# Patient Record
Sex: FEMALE | Race: WHITE | NOT HISPANIC OR LATINO
[De-identification: names, ages, dates, MRNs, and addresses within clinical notes are randomized per-mention and may not be internally consistent; named-entity substitution may affect disease eponyms.]

---

## 2019-10-07 PROBLEM — Z00.00 ENCOUNTER FOR PREVENTIVE HEALTH EXAMINATION: Status: ACTIVE | Noted: 2019-10-07

## 2019-10-18 ENCOUNTER — APPOINTMENT (OUTPATIENT)
Dept: INTERNAL MEDICINE | Facility: CLINIC | Age: 69
End: 2019-10-18
Payer: MEDICARE

## 2019-10-18 VITALS
HEART RATE: 88 BPM | HEIGHT: 65 IN | DIASTOLIC BLOOD PRESSURE: 70 MMHG | TEMPERATURE: 98.9 F | SYSTOLIC BLOOD PRESSURE: 114 MMHG | WEIGHT: 152 LBS | BODY MASS INDEX: 25.33 KG/M2

## 2019-10-18 DIAGNOSIS — G89.29 OTHER CHRONIC PAIN: ICD-10-CM

## 2019-10-18 DIAGNOSIS — M16.51 UNILATERAL POST-TRAUMATIC OSTEOARTHRITIS, RIGHT HIP: ICD-10-CM

## 2019-10-18 DIAGNOSIS — Z87.39 PERSONAL HISTORY OF OTHER DISEASES OF THE MUSCULOSKELETAL SYSTEM AND CONNECTIVE TISSUE: ICD-10-CM

## 2019-10-18 DIAGNOSIS — E78.2 MIXED HYPERLIPIDEMIA: ICD-10-CM

## 2019-10-18 DIAGNOSIS — Z78.9 OTHER SPECIFIED HEALTH STATUS: ICD-10-CM

## 2019-10-18 DIAGNOSIS — G43.009 MIGRAINE W/OUT AURA, NOT INTRACTABLE, W/OUT STATUS MIGRAINOSUS: ICD-10-CM

## 2019-10-18 DIAGNOSIS — Z01.818 ENCOUNTER FOR OTHER PREPROCEDURAL EXAMINATION: ICD-10-CM

## 2019-10-18 PROCEDURE — 99204 OFFICE O/P NEW MOD 45 MIN: CPT

## 2019-10-18 RX ORDER — RIZATRIPTAN BENZOATE 10 MG/1
10 TABLET ORAL
Refills: 0 | Status: ACTIVE | COMMUNITY
Start: 2019-10-18

## 2019-10-18 RX ORDER — PITAVASTATIN CALCIUM 4.18 MG/1
4 TABLET, FILM COATED ORAL
Qty: 90 | Refills: 0 | Status: ACTIVE | COMMUNITY
Start: 2019-10-18

## 2019-10-18 RX ORDER — DULOXETINE HYDROCHLORIDE 20 MG/1
20 CAPSULE, DELAYED RELEASE ORAL DAILY
Refills: 0 | Status: ACTIVE | COMMUNITY
Start: 2019-10-18

## 2019-10-18 NOTE — ASSESSMENT
[Patient Optimized for Surgery] : Patient optimized for surgery [No Further Testing Recommended] : no further testing recommended [FreeTextEntry4] : Ms. DAWSON is a 69 year yo female with no h/o CAD and good exercise tolerance. She denies CP, palpitation or SOB and her EKG today is normal. She does not take blood thinners and denies sleep apnea or urinary obstruction symptoms. She does not have a h/o DVT. She will continue the prescription medications perioperatively as instructed. The morning of the procedure she will only take the Cymbalta.\par \par Ms. DAWSON has a moderate risk for perioperative cardiovascular complications and will undergo the procedure as planned. I will follow her postop.\par \par  ***More than 50% of the face to face time was devoted to counseling and/or coordination of care. The discussion and/or coordination of care included: perioperative medication management.\par \par  [As per surgery] : as per surgery

## 2019-10-18 NOTE — HISTORY OF PRESENT ILLNESS
[No Pertinent Cardiac History] : no history of aortic stenosis, atrial fibrillation, coronary artery disease, recent myocardial infarction, or implantable device/pacemaker [No Pertinent Pulmonary History] : no history of asthma, COPD, sleep apnea, or smoking [No Adverse Anesthesia Reaction] : no adverse anesthesia reaction in self or family member [Chronic Anticoagulation] : no chronic anticoagulation [Chronic Kidney Disease] : no chronic kidney disease [Diabetes] : no diabetes [(Patient denies any chest pain, claudication, dyspnea on exertion, orthopnea, palpitations or syncope)] : Patient denies any chest pain, claudication, dyspnea on exertion, orthopnea, palpitations or syncope [FreeTextEntry1] : R THR , conversion for partial hip [FreeTextEntry3] : DR Chan [FreeTextEntry2] : 10/30 [FreeTextEntry4] : Dear Dr. Chan : Thank you for referring Ms. DAWSON for preoperative evaluation prior to R THR on 10/30.  She  is complaining of R hip pain since earlier this year after a fall. pain is getting worse in the last months. Anti-inflammatories and physical therapy are not helping any longer.\par PCP DR Lopez\par \par

## 2019-10-18 NOTE — PHYSICAL EXAM
[Normal] : normal gait, coordination grossly intact, no focal deficits [de-identified] : R hip c limited and tender ROM

## 2019-10-30 ENCOUNTER — TRANSCRIPTION ENCOUNTER (OUTPATIENT)
Age: 69
End: 2019-10-30

## 2020-10-16 ENCOUNTER — TRANSCRIPTION ENCOUNTER (OUTPATIENT)
Age: 70
End: 2020-10-16

## 2023-02-10 ENCOUNTER — APPOINTMENT (OUTPATIENT)
Dept: BREAST CENTER | Facility: CLINIC | Age: 73
End: 2023-02-10
Payer: MEDICARE

## 2023-02-10 VITALS
TEMPERATURE: 98.2 F | HEIGHT: 65 IN | BODY MASS INDEX: 23.82 KG/M2 | HEART RATE: 90 BPM | WEIGHT: 143 LBS | DIASTOLIC BLOOD PRESSURE: 78 MMHG | OXYGEN SATURATION: 98 % | SYSTOLIC BLOOD PRESSURE: 133 MMHG

## 2023-02-10 DIAGNOSIS — R92.8 OTHER ABNORMAL AND INCONCLUSIVE FINDINGS ON DIAGNOSTIC IMAGING OF BREAST: ICD-10-CM

## 2023-02-10 DIAGNOSIS — N60.11 DIFFUSE CYSTIC MASTOPATHY OF RIGHT BREAST: ICD-10-CM

## 2023-02-10 DIAGNOSIS — Z80.3 FAMILY HISTORY OF MALIGNANT NEOPLASM OF BREAST: ICD-10-CM

## 2023-02-10 DIAGNOSIS — N60.12 DIFFUSE CYSTIC MASTOPATHY OF RIGHT BREAST: ICD-10-CM

## 2023-02-10 DIAGNOSIS — L40.50 ARTHROPATHIC PSORIASIS, UNSPECIFIED: ICD-10-CM

## 2023-02-10 PROCEDURE — 99203 OFFICE O/P NEW LOW 30 MIN: CPT

## 2023-02-10 NOTE — HISTORY OF PRESENT ILLNESS
[FreeTextEntry1] : The patient is a 72-year-old nulliparous postmenopausal white female of [place] descent.  She underwent menarche at age 14.  She underwent menopause at age 50 after a supracervical hysterectomy and bilateral salpingo-oophorectomy which was benign.  She did use hormone replacement patch for about 1 year after menopause.  She is the sister of Dr. Schultz.  She has a past medical history significant for psoriatic arthritis and hypercholesterolemia.  She has a strong family history with her mother who was a patient of ours who had breast cancer at age 90.  She was sent for routine mammography and ultrasound which was performed at [facility] on January 9, 2023 showing a new left breast 3:00 density 1 cm from the nipple for which diagnostic ultrasound is being recommended.  She comes in now for a surgical evaluation.

## 2023-02-10 NOTE — REVIEW OF SYSTEMS
[Feeling Tired] : feeling tired [Eyesight Problems] : eyesight problems [Eyes Itch] : itching of the eyes [Itching] : itching [Depression] : depression [Negative] : Heme/Lymph

## 2023-02-10 NOTE — PAST MEDICAL HISTORY
[Postmenopausal] : The patient is postmenopausal [Menarche Age ____] : age at menarche was [unfilled] [Menopause Age____] : age at menopause was [unfilled] [History of Hormone Replacement Treatment] : has a history of hormone replacement treatment [Definite ___ (Date)] : the last menstrual period was [unfilled] [Total Preg ___] : G[unfilled]

## 2023-02-10 NOTE — PHYSICAL EXAM
[Normocephalic] : normocephalic [Atraumatic] : atraumatic [EOMI] : extra ocular movement intact [Supple] : supple [No Supraclavicular Adenopathy] : no supraclavicular adenopathy [No Cervical Adenopathy] : no cervical adenopathy [Examined in the supine and seated position] : examined in the supine and seated position [No dominant masses] : no dominant masses in right breast  [No dominant masses] : no dominant masses left breast [No Nipple Retraction] : no left nipple retraction [No Nipple Discharge] : no left nipple discharge [Breast Mass Right Breast ___cm] : no masses [Breast Mass Left Breast ___cm] : no masses [Breast Nipple Inversion] : nipples not inverted [Breast Nipple Retraction] : nipples not retracted [Breast Nipple Flattening] : nipples not flattened [Breast Nipple Fissures] : nipples not fissured [Breast Abnormal Lactation (Galactorrhea)] : no galactorrhea [Breast Abnormal Secretion Bloody Fluid] : no bloody discharge [Breast Abnormal Secretion Serous Fluid] : no serous discharge [Breast Abnormal Secretion Opalescent Fluid] : no milky discharge [No Axillary Lymphadenopathy] : no left axillary lymphadenopathy [No Edema] : no edema [No Ulceration] : no ulceration [de-identified] : On exam, the patient has B-cup breasts.  On palpation, I cannot feel any suspicious findings even with close attention to the left breast 3:00 region.  She has no axillary, supraclavicular, or cervical adenopathy.  She does have a significant erythematous midline upper chest wall mass consistent with her psoriatic arthritis. [de-identified] : Significant midline chest rash consistent with her psoriatic arthritis.

## 2023-02-10 NOTE — ASSESSMENT
[FreeTextEntry1] : The patient is a 72-year-old nulliparous postmenopausal white female of [place] descent.  She underwent menarche at age 14.  She underwent menopause at age 50 after a supracervical hysterectomy and bilateral salpingo-oophorectomy which was benign.  She did use hormone replacement patch for about 1 year after menopause. She is the sister of Dr. Schultz. She has a past medical history significant for psoriatic arthritis and hypercholesterolemia.  She has a strong family history with her mother who was a patient of ours who had breast cancer at age 90.  She was sent for routine mammography and ultrasound which was performed at [facility] on January 9, 2023 showing a new left breast 3:00 density 1 cm from the nipple for which diagnostic ultrasound is being recommended.  I reviewed her imaging studies on CD-ROM from [facility] and the mammography was completely negative but the ultrasound did show this vague density in the left breast 3:00 region 1 cm from the nipple which is fairly well marginated and not suspicious.  I agree with the recommendation to get a diagnostic directed ultrasound at this time.  She did bring in her prior images from [facility] in [place] but these are all mammographies and are not going to be helpful.  She was told to obtain this left breast 3:00 diagnostic ultrasound here at [facility] and I will follow-up on the report.  If this shows this density to be completely benign then she can just follow-up on an as needed basis and continue with her yearly mammography and ultrasound again in January 2024.  The patient understands and agrees to proceed as planned.

## 2023-02-10 NOTE — REASON FOR VISIT
[Initial Evaluation] : an initial evaluation [FreeTextEntry1] : The patient is a postmenopausal white female of [place] descent with a family history of breast cancer with her mother who had breast cancer at age 90.  She underwent a recent bilateral mammography and ultrasound showing a left breast 3:00 density for which a diagnostic left breast ultrasound was recommended.  She comes in now for surgical evaluation.

## 2023-03-03 ENCOUNTER — NON-APPOINTMENT (OUTPATIENT)
Age: 73
End: 2023-03-03

## 2023-03-03 ENCOUNTER — RESULT REVIEW (OUTPATIENT)
Age: 73
End: 2023-03-03

## 2023-04-26 ENCOUNTER — RESULT REVIEW (OUTPATIENT)
Age: 73
End: 2023-04-26

## 2023-04-28 ENCOUNTER — NON-APPOINTMENT (OUTPATIENT)
Age: 73
End: 2023-04-28